# Patient Record
Sex: FEMALE | Race: BLACK OR AFRICAN AMERICAN
[De-identification: names, ages, dates, MRNs, and addresses within clinical notes are randomized per-mention and may not be internally consistent; named-entity substitution may affect disease eponyms.]

---

## 2018-08-28 LAB
ANION GAP SERPL CALC-SCNC: 15 MMOL/L (ref 5–19)
APPEARANCE UR: CLEAR
APTT PPP: (no result) S
BILIRUB UR QL STRIP: NEGATIVE
BUN SERPL-MCNC: 15 MG/DL (ref 7–20)
CALCIUM: 9.8 MG/DL (ref 8.4–10.2)
CHLORIDE SERPL-SCNC: 104 MMOL/L (ref 98–107)
CO2 SERPL-SCNC: 21 MMOL/L (ref 22–30)
ERYTHROCYTE [DISTWIDTH] IN BLOOD BY AUTOMATED COUNT: 12.8 % (ref 11.5–14)
GLUCOSE SERPL-MCNC: 81 MG/DL (ref 75–110)
GLUCOSE UR STRIP-MCNC: NEGATIVE MG/DL
HCT VFR BLD CALC: 43.1 % (ref 36–47)
HGB BLD-MCNC: 14.5 G/DL (ref 12–15.5)
KETONES UR STRIP-MCNC: NEGATIVE MG/DL
MCH RBC QN AUTO: 29.4 PG (ref 27–33.4)
MCHC RBC AUTO-ENTMCNC: 33.6 G/DL (ref 32–36)
MCV RBC AUTO: 87 FL (ref 80–97)
NITRITE UR QL STRIP: NEGATIVE
PH UR STRIP: 6 [PH] (ref 5–9)
PLATELET # BLD: 264 10^3/UL (ref 150–450)
POTASSIUM SERPL-SCNC: 5.2 MMOL/L (ref 3.6–5)
PROT UR STRIP-MCNC: NEGATIVE MG/DL
RBC # BLD AUTO: 4.94 10^6/UL (ref 3.72–5.28)
SODIUM SERPL-SCNC: 139.5 MMOL/L (ref 137–145)
SP GR UR STRIP: 1.01
UROBILINOGEN UR-MCNC: NEGATIVE MG/DL (ref ?–2)
WBC # BLD AUTO: 5.7 10^3/UL (ref 4–10.5)

## 2018-08-28 NOTE — RADIOLOGY REPORT (SQ)
EXAM DESCRIPTION:  CHEST PA/LATERAL



COMPLETED DATE/TIME:  8/28/2018 11:12 am



REASON FOR STUDY:  PRE-OP



COMPARISON:  None.



EXAM PARAMETERS:  NUMBER OF VIEWS: two views

TECHNIQUE: Digital Frontal and Lateral radiographic views of the chest acquired.

RADIATION DOSE: NA

LIMITATIONS: none



FINDINGS:  LUNGS AND PLEURA: No opacities, masses or pneumothorax. No pleural effusion.

MEDIASTINUM AND HILAR STRUCTURES: No masses or contour abnormalities.

HEART AND VASCULAR STRUCTURES: Heart normal size.  No evidence for failure.

BONES: No acute findings.

HARDWARE: None in the chest.

OTHER: No other significant finding.



IMPRESSION:  NO SIGNIFICANT RADIOGRAPHIC FINDING IN THE CHEST.



TECHNICAL DOCUMENTATION:  JOB ID:  [id number]

 2011 [employer]- All Rights Reserved



Reading location - IP/workstation name: [email or web address]

## 2018-09-04 ENCOUNTER — HOSPITAL ENCOUNTER (OUTPATIENT)
Dept: HOSPITAL 62 - OROUT | Age: 28
Discharge: HOME | End: 2018-09-04
Attending: ORTHOPAEDIC SURGERY
Payer: OTHER GOVERNMENT

## 2018-09-04 VITALS — DIASTOLIC BLOOD PRESSURE: 66 MMHG | SYSTOLIC BLOOD PRESSURE: 112 MMHG

## 2018-09-04 DIAGNOSIS — M62.81: ICD-10-CM

## 2018-09-04 DIAGNOSIS — M25.531: ICD-10-CM
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
## 2018-09-04 DIAGNOSIS — S63.511A: ICD-10-CM

## 2018-09-04 DIAGNOSIS — S63.071A: ICD-10-CM

## 2018-09-04 DIAGNOSIS — Z01.810: ICD-10-CM

## 2018-09-04 DIAGNOSIS — M19.031: ICD-10-CM

## 2018-09-04 DIAGNOSIS — Z01.812: ICD-10-CM

## 2018-09-04 DIAGNOSIS — M77.8: Primary | ICD-10-CM

## 2018-09-04 DIAGNOSIS — Z01.818: ICD-10-CM

## 2018-09-04 DIAGNOSIS — M65.831: ICD-10-CM

## 2018-09-04 DIAGNOSIS — Z01.811: ICD-10-CM

## 2018-09-04 DIAGNOSIS — S63.591A: ICD-10-CM

## 2018-09-04 PROCEDURE — 25690 CLTX LUNATE DISLC W/MNPJ: CPT

## 2018-09-04 PROCEDURE — 93010 ELECTROCARDIOGRAM REPORT: CPT

## 2018-09-04 PROCEDURE — S0028 INJECTION, FAMOTIDINE, 20 MG: HCPCS

## 2018-09-04 PROCEDURE — 36415 COLL VENOUS BLD VENIPUNCTURE: CPT

## 2018-09-04 PROCEDURE — 81025 URINE PREGNANCY TEST: CPT

## 2018-09-04 PROCEDURE — 85027 COMPLETE CBC AUTOMATED: CPT

## 2018-09-04 PROCEDURE — 73100 X-RAY EXAM OF WRIST: CPT

## 2018-09-04 PROCEDURE — C1713 ANCHOR/SCREW BN/BN,TIS/BN: HCPCS

## 2018-09-04 PROCEDURE — 81001 URINALYSIS AUTO W/SCOPE: CPT

## 2018-09-04 PROCEDURE — 01820 ANES CLSD PX RDS U/W/H BONES: CPT

## 2018-09-04 PROCEDURE — 93005 ELECTROCARDIOGRAM TRACING: CPT

## 2018-09-04 PROCEDURE — 29844 WRIST ARTHROSCOPY/SURGERY: CPT

## 2018-09-04 PROCEDURE — 80048 BASIC METABOLIC PNL TOTAL CA: CPT

## 2018-09-04 PROCEDURE — 84132 ASSAY OF SERUM POTASSIUM: CPT

## 2018-09-04 PROCEDURE — 71046 X-RAY EXAM CHEST 2 VIEWS: CPT

## 2018-09-04 PROCEDURE — 25116 REMOVE WRIST/FOREARM LESION: CPT

## 2018-09-04 RX ADMIN — FENTANYL CITRATE ONE MCG: 50 INJECTION INTRAMUSCULAR; INTRAVENOUS at 12:20

## 2018-09-04 RX ADMIN — FENTANYL CITRATE ONE MCG: 50 INJECTION INTRAMUSCULAR; INTRAVENOUS at 12:10

## 2018-09-04 NOTE — OPERATIVE REPORT
Operative Report


DATE OF SURGERY: 09/04/18


PREOPERATIVE DIAGNOSIS: Right wrist TFCC tear, EDC subluxation/sub sheath tear


POSTOPERATIVE DIAGNOSIS: Same+.  Kiera grade II scapholunate ligament tear


OPERATION: Right wrist arthroscopy with partial synovectomy ulnocarpal joint.  

Open ECU sub-sheath reconstruction, tenosynovectomy.  Percutaneous pinning 

scapholunate interval


SURGEON: RONI RAMESH


ANESTHESIA: GA


COMPLICATIONS: 





None


ESTIMATED BLOOD LOSS: Minimal


PROCEDURE: 





Indication for above procedure:





28-year-old female presents with pain in her right wrist.  Patient attempted 

conservative measures including bracing and anti-inflammatories.  MRI 

examination demonstrate evidence of TFCC  pathology and instability of the ECU.

  At that point we discussed treatment options including operative versus 

nonoperative intervention.  After discussing risks and benefits of both joint 

decision was made to proceed with operative treatment.





Procedure In Detail:





Patient was seen and evaluated in the preoperative holding area.  The RIGHT 

upper extremity was initialized and marked.  Patient received 2g of Ancef IV 

for bacterial prophylaxis.  Patient was taken back to the operative room where 

transferred to the operative table and placed under general anesthesia.  Once 

they were adequately anesthetized a nonsterile tourniquet was placed on the 

upper extremity.  A surgical team debriefing was performed ensuring all 

instrumentation was available, the surgical procedure was discussed with 

possible concerns reviewed.  The upper extremity was prepped with chlorhexidine 

and alcohol and draped in a sterile fashion.   A timeout was done identifying 

correct patient, procedure and extremity everyone in attendance agree with this 

and verbalized no concerns.  Patient was placed in the Acumed wrist distraction 

system.  The extremity was exsanguinated the tourniquet was inflated to 250 

mmHg.





A 3-4 portal was established.  Dry arthroscopy demonstrated no evidence of 

degenerative change along the radiocarpal joint intact membranous portion of 

the scapholunate interval.  A R-U portal was established Via triangulation.  

There was synovitis within the prestyloid recess at the ulnohumeral joint no 

evidence of TFCC tear after probing.  Normal trampoline effect noted.  The 

arthroscopic shaver was then introduced into the ulnocarpal joint and a partial 

synovectomy was performed.  The area was then smoothed with the ablator.





A midcarpal radial portal was established and via triangulation a midcarpal 

ulnar portal established.  Inspection of the midcarpal joint demonstrated no 

significant synovitis there is no widening of the lunotriquetral interval.  

There was evidence of widening at the scapholunate interval with fraying of the 

dorsal scapholunate ligament however complete tear was not appreciated.  The 

probe was then introduced into the scapholunate interval however there was not 

evidence of step-off.  Thus it was likely a Kiera II scapholunate 

disassociation but given patient's lack of preoperative findings also normal 

variant as possible.  Thus decision was to proceed with scapholunate pinning.  

The scapholunate ligament interval was reduced and a small stab incision was 

made just distal to the radial styloid.  The superficial radial nerve was 

identified along with the extensor tendons and retracted until the scaphoid was 

exposed. 2x 0.045 K wires were placed across the scapholunate interval while 

maintaining reduction.  These K wires were then bent and cut left outside the 

skin. 





Longitudinal skin incision was made over the sixth dorsal compartment.  Blunt 

dissection performed.  Branches of the dorsal ulnar sensory branch were 

identified and retracted.  The fourth and fifth dorsal compartments along with 

the 6th dorsal compartment were identified and opened in a transverse 

direction.  I then elevated the septum between the fourth/fifth dorsal 

compartment and fifth/sixth dorsal compartment along with the extensor 

retinaculum, sub-sheath of the ECU was left intact.  This exposed the ECU sub-

sheath and tendon.  There was mild tenosynovium within the subcu fat and thus a 

tenosynovectomy was performed of the ECU.  There was patulous of the sub-sheath 

noted with the ECU subluxated in a ulnar direction.  Thus the sub-sheath was 

opened in a shallow groove was appreciated.  The groove was then contoured and 

the labrum of the sub-sheath elevated along with the linear jugata only to 

allow for repair.  A Arthrex micro anchor and mini anchor were inserted under C 

arm fluoroscopy to confirm appropriate placement.  The linea jugata and ulnar 

limb of the sub-sheath was then repaired to their native location with 

horizontal mattress transversing the labrum/sub-sheath and the flap of extensor 

retinaculum this adequately provided a buttress to the ECU tendon avoiding 

subluxation and ulnar direction.  The remaining extensor retinaculum was then 

placed underneath the EDM and secured to the remaining fourth dorsal 

compartment with interrupted 2-0 Ethibond.  This provided stability of the ECU 

tendon throughout wrist range of motion including pronation and supination.  

There is no evidence of residual DRUJ instability once repair was completed.





The wound was then copiously irrigated with normal saline.  Tourniquet was 

deflated.  Any peripheral bleeding was controlled with bipolar cautery.  

Subcutaneous tissues were closed with interrupted 4-0 Monocryl suture.  Skin 

was closed with running subcuticular 4-0 Monocryl reinforced with Dermabond and 

Steri-Strips.  Patient was placed in a sugar tong splint with the wrist in mild 

pronation.





Sponge counts, instrument counts, needle counts counts were correct.  Patient 

was then awoken from anesthesia.  Transferred from the operating room table to 

the operating room stretcher.  There was no intraoperative complications 

patient tolerated procedure well stable to PACU.





Postoperative plan:





Patient will continue with long-arm splint for the next 2 weeks.  Within be 

fitted Occupational Therapy for a two-week custom long arm splint.  Flexion-

extension will begin at 20/20 increasing 10 every 5 days.  Pronation will 

begin at 20/20 increasing every 5 days.  Will begin strengthening once patient 

received 75% motion.  May continue taping during the strength phase.  Return to 

plate 3 months.

## 2018-09-04 NOTE — RADIOLOGY REPORT (SQ)
EXAM DESCRIPTION:  WRIST RIGHT 2 VIEWS; NO CHG FLUORO



COMPLETED DATE/TIME:  9/4/2018 11:41 am



REASON FOR STUDY:  RT WRIST M77.8  OTHER ENTHESOPATHIES, NOT ELSEWHERE CLASSIFIED



COMPARISON:  None.



FLUOROSCOPY TIME:  26 seconds

17 fluoro images images saved to PACS.



TECHNIQUE:  Intra-operative images acquired during surgical procedure to evaluate progress.

NUMBER OF IMAGES: 17 fluoro images



LIMITATIONS:  None.



FINDINGS:  Intraoperative imaging and fluoro during K-wire placement across the scapholunate joint



IMPRESSION:  Intra procedural imaging and fluoro



COMMENT:  Quality :  Final reports for procedures using fluoroscopy that document radiation exp
osure indices, or exposure time and number of fluorographic images (if radiation exposure indices are
 not available)

Please consult full operative report of the attending physician for description of the procedure.



TECHNICAL DOCUMENTATION:  JOB ID:  [id number]

 2011 [employer]- All Rights Reserved



Reading location - IP/workstation name: [email or web address]

## 2018-09-04 NOTE — RADIOLOGY REPORT (SQ)
EXAM DESCRIPTION:  WRIST RIGHT 2 VIEWS; NO CHG FLUORO



COMPLETED DATE/TIME:  9/4/2018 11:41 am



REASON FOR STUDY:  RT WRIST M77.8  OTHER ENTHESOPATHIES, NOT ELSEWHERE CLASSIFIED



COMPARISON:  None.



FLUOROSCOPY TIME:  26 seconds

17 fluoro images images saved to PACS.



TECHNIQUE:  Intra-operative images acquired during surgical procedure to evaluate progress.

NUMBER OF IMAGES: 17 fluoro images



LIMITATIONS:  None.



FINDINGS:  Intraoperative imaging and fluoro during K-wire placement across the scapholunate joint



IMPRESSION:  Intra procedural imaging and fluoro



COMMENT:  Quality :  Final reports for procedures using fluoroscopy that document radiation exp
osure indices, or exposure time and number of fluorographic images (if radiation exposure indices are
 not available)

Please consult full operative report of the attending physician for description of the procedure.



TECHNICAL DOCUMENTATION:  JOB ID:  [id number]

 2011 [employer]- All Rights Reserved



Reading location - IP/workstation name: [email or web address]

## 2018-09-04 NOTE — DISCHARGE SUMMARY
Discharge Summary (SDC)





- Discharge


Final Diagnosis: 





Right wrist TFCC tear, EDC subluxation/sub sheath tear, SL Tear


Date of Surgery: 09/04/18


Discharge Date: 09/04/18


Condition: Good


Treatment or Instructions: 











Schedule Follow Up w/ Dr. Jose Luis Butterfield @ [facility] to be seen 

in 10-14 days or as scheduled


[place]: [phone] 


[place]: [phone]


[place]: [phone] 





Ice and elevate





Keep splint clean/dry/intact.





If your fingers become numb please unwrap the Ace wrap but leave the splint in 

place, if the sensation does not return within 30 minutes please return to the 

emergency department.





May begin finger range of motion attempting to make full fist.





Please use ibuprofen (Motrin or Advil) 600-800 mg every 8 hours as needed for 

pain or fever DO NOT TAKE w/ TORADOL may use once TORADOL complete.  You may 

also use acetaminophen (Tylenol) 1000 mg every 4-6 hours as needed for pain or 

fever.  Please be aware that many medications contain acetaminophen, do not 

exceed a total of 1000 mg of acetaminophen every 6 hours.  





If ibuprofen and acetaminophen are not sufficient for your pain you may take 

the Percocet/Norco.  Please be aware that the Percocet/Norco does contain 

Tylenol.





Stool softener of choice when on pain medication.


Prescriptions: 


Ketorolac Tromethamine [Toradol 10 mg Tablet] 10 mg PO Q8HP PRN #10 tablet


 PRN Reason: 


Oxycodone HCl/Acetaminophen [Percocet 5-325 mg Tablet] 1 tab PO Q6 PRN #30 tab


 PRN Reason: